# Patient Record
Sex: FEMALE | Race: BLACK OR AFRICAN AMERICAN | NOT HISPANIC OR LATINO | Employment: OTHER | URBAN - METROPOLITAN AREA
[De-identification: names, ages, dates, MRNs, and addresses within clinical notes are randomized per-mention and may not be internally consistent; named-entity substitution may affect disease eponyms.]

---

## 2017-04-19 ENCOUNTER — HOSPITAL ENCOUNTER (OUTPATIENT)
Dept: RADIOLOGY | Facility: OTHER | Age: 77
Discharge: HOME | End: 2017-04-19
Attending: OTOLARYNGOLOGY
Payer: MEDICARE

## 2017-04-19 DIAGNOSIS — J32.4 PANSINUSITIS: ICD-10-CM

## 2017-04-19 PROCEDURE — 70486 CT MAXILLOFACIAL W/O DYE: CPT | Mod: 26,,, | Performed by: RADIOLOGY

## 2017-04-19 PROCEDURE — 70486 CT MAXILLOFACIAL W/O DYE: CPT | Mod: TC

## 2017-05-23 PROBLEM — M79.2: Status: ACTIVE | Noted: 2017-05-23

## 2017-05-29 NOTE — PLAN OF CARE
05/29/17 1026   ED Admissions Case Approval   ED Admissions Case Approval (!) CM Approved  (OP )

## 2017-06-01 ENCOUNTER — HOSPITAL ENCOUNTER (OUTPATIENT)
Dept: PREADMISSION TESTING | Facility: OTHER | Age: 77
Discharge: HOME | End: 2017-06-01
Attending: OTOLARYNGOLOGY
Payer: MEDICARE

## 2017-06-01 ENCOUNTER — ANESTHESIA EVENT (OUTPATIENT)
Dept: SURGERY | Facility: OTHER | Age: 77
End: 2017-06-01
Payer: MEDICARE

## 2017-06-01 VITALS
RESPIRATION RATE: 16 BRPM | HEIGHT: 64 IN | WEIGHT: 170 LBS | SYSTOLIC BLOOD PRESSURE: 134 MMHG | TEMPERATURE: 98 F | OXYGEN SATURATION: 97 % | DIASTOLIC BLOOD PRESSURE: 68 MMHG | BODY MASS INDEX: 29.02 KG/M2 | HEART RATE: 68 BPM

## 2017-06-01 DIAGNOSIS — Z01.818 PRE-OPERATIVE CLEARANCE: ICD-10-CM

## 2017-06-01 PROCEDURE — 93010 ELECTROCARDIOGRAM REPORT: CPT | Mod: ,,, | Performed by: INTERNAL MEDICINE

## 2017-06-01 PROCEDURE — 93005 ELECTROCARDIOGRAM TRACING: CPT

## 2017-06-01 RX ORDER — LIDOCAINE HYDROCHLORIDE 10 MG/ML
1 INJECTION, SOLUTION EPIDURAL; INFILTRATION; INTRACAUDAL; PERINEURAL ONCE
Status: CANCELLED | OUTPATIENT
Start: 2017-06-01 | End: 2017-06-01

## 2017-06-01 RX ORDER — SODIUM CHLORIDE, SODIUM LACTATE, POTASSIUM CHLORIDE, CALCIUM CHLORIDE 600; 310; 30; 20 MG/100ML; MG/100ML; MG/100ML; MG/100ML
INJECTION, SOLUTION INTRAVENOUS CONTINUOUS
Status: CANCELLED | OUTPATIENT
Start: 2017-06-01

## 2017-06-01 RX ORDER — PREGABALIN 75 MG/1
150 CAPSULE ORAL
Status: DISCONTINUED | OUTPATIENT
Start: 2017-06-07 | End: 2017-06-02 | Stop reason: HOSPADM

## 2017-06-01 RX ORDER — MIDAZOLAM HYDROCHLORIDE 5 MG/ML
2 INJECTION INTRAMUSCULAR; INTRAVENOUS
Status: CANCELLED | OUTPATIENT
Start: 2017-06-01 | End: 2017-06-01

## 2017-06-01 NOTE — DISCHARGE INSTRUCTIONS
PRE-ADMIT TESTING -  [phone]    [address]  [facility]        OUTPATIENT SURGERY UNIT - [phone]    Your surgery has been scheduled at [facility]. We are pleased to have the opportunity to serve you. For Further Information please call [phone].    On the day of surgery please report to the Information Desk on the 1st floor.    · CONTACT YOUR PHYSICIAN'S OFFICE THE DAY PRIOR TO YOUR SURGERY TO OBTAIN YOUR ARRIVAL TIME.     · The evening before surgery do not eat anything after 9 p.m. ( this includes hard candy, chewing gum and mints).  You may only have GATORADE, POWERADE AND WATER  from 9 p.m. until you leave your home.   DO NOT DRINK ANY LIQUIDS ON THE WAY TO THE HOSPITAL.      SPECIAL MEDICATION INSTRUCTIONS: TAKE medications checked off by the Anesthesiologist on your Medication List.    Angiogram Patients: Take medications as instructed by your physician, including aspirin.     Surgery Patients:    If you take ASPIRIN - Your PHYSICIAN/SURGEON will need to inform you IF/OR when you need to stop taking aspirin prior to your surgery.     Do Not take any medications containing IBUPROFEN.  Do Not Wear any make-up or dark nail polish   (especially eye make-up) to surgery. If you come to surgery with makeup on you will be required to remove the makeup or nail polish.    Do not shave your surgical area at least 5 days prior to your surgery. The surgical prep will be performed at the hospital according to Infection Control regulations.    Leave all valuables at home.   Do Not wear any jewelry or watches, including any metal in body piercings.  Contact Lens must be removed before surgery. Either do not wear the contact lens or bring a case and solution for storage.  Please bring a container for eyeglasses or dentures as required.  Bring any paperwork your physician has provided, such as consent forms,  history and physicals, doctor's orders, etc.   Bring comfortable clothes  that are loose fitting to wear upon discharge. Take into consideration the type of surgery being performed.  Maintain your diet as advised per your physician the day prior to surgery.      Adequate rest the night before surgery is advised.   Park in the Parking lot behind the hospital or in the [facility] across the street from the parking lot. Parking is complimentary.  If you will be discharged the same day as your procedure, please arrange for a responsible adult to drive you home or to accompany you if traveling by taxi.   YOU WILL NOT BE PERMITTED TO DRIVE OR TO LEAVE THE HOSPITAL ALONE AFTER SURGERY.   It is strongly recommended that you arrange for someone to remain with you for the first 24 hrs following your surgery.       Thank you for your cooperation.  The Staff of [facility].        Bathing Instructions                                                                 Please shower the evening before and morning of your procedure with    ANTIBACTERIAL SOAP. ( DIAL, etc )  Concentrate on the surgical area   for at least 3 minutes and rinse completely. Dry off as usual.   Do not use any deodorant, powder, body lotions, perfume, after shave or    cologne.

## 2017-06-01 NOTE — ANESTHESIA PREPROCEDURE EVALUATION
06/01/2017  Sameer New is a 76 y.o., female.    Anesthesia Evaluation    I have reviewed the Patient Summary Reports.    I have reviewed the Nursing Notes.   I have reviewed the Medications.     Review of Systems  Anesthesia Hx:  No problems with previous Anesthesia  History of prior surgery of interest to airway management or planning:   Social:  Former Smoker Stopped smoking 2007   Hematology/Oncology:  Hematology Normal        EENT/Dental:EENT/Dental Normal   Cardiovascular:   Hypertension, well controlled hyperlipidemia    Pulmonary:  Pulmonary Normal    Renal/:  Renal/ Normal  Pt states no kidney disease   Hepatic/GI:  Hepatic/GI Normal    Musculoskeletal:   Arthritis     Neurological:  Neurology Normal    Endocrine:  Endocrine Normal    Dermatological:  Skin Normal    Psych:  Psychiatric Normal           Physical Exam  General:  Obesity      Dental:  Dental Findings: upper partial dentures, lower partial dentures        Mental Status:  Mental Status Findings:  Cooperative, Alert and Oriented         Anesthesia Plan  Type of Anesthesia, risks & benefits discussed:  Anesthesia Type:  general  Patient's Preference:   Intra-op Monitoring Plan:   Intra-op Monitoring Plan Comments:   Post Op Pain Control Plan:   Post Op Pain Control Plan Comments:   Induction:   IV  Beta Blocker:         Informed Consent: Patient understands risks and agrees with Anesthesia plan.  Questions answered. Anesthesia consent signed with patient.  ASA Score: 2     Day of Surgery Review of History & Physical:    H&P update referred to the surgeon.     Anesthesia Plan Notes: Labs in epic        Ready For Surgery From Anesthesia Perspective.

## 2017-06-07 ENCOUNTER — ANESTHESIA (OUTPATIENT)
Dept: SURGERY | Facility: OTHER | Age: 77
End: 2017-06-07
Payer: MEDICARE

## 2017-06-07 ENCOUNTER — HOSPITAL ENCOUNTER (OUTPATIENT)
Facility: OTHER | Age: 77
Discharge: HOME | End: 2017-06-07
Attending: OTOLARYNGOLOGY | Admitting: OTOLARYNGOLOGY
Payer: MEDICARE

## 2017-06-07 VITALS
TEMPERATURE: 97 F | RESPIRATION RATE: 16 BRPM | DIASTOLIC BLOOD PRESSURE: 69 MMHG | HEART RATE: 80 BPM | HEIGHT: 64 IN | WEIGHT: 170 LBS | BODY MASS INDEX: 29.02 KG/M2 | OXYGEN SATURATION: 96 % | SYSTOLIC BLOOD PRESSURE: 138 MMHG

## 2017-06-07 DIAGNOSIS — D38.5 NEOPLASM OF UNCERTAIN BEHAVIOR OF NASAL CAVITIES: Primary | ICD-10-CM

## 2017-06-07 DIAGNOSIS — D38.5 NEOPLASM OF UNCERTAIN BEHAVIOR OF ACCESSORY SINUS: ICD-10-CM

## 2017-06-07 PROCEDURE — 88305 TISSUE EXAM BY PATHOLOGIST: CPT | Mod: 26,,, | Performed by: PATHOLOGY

## 2017-06-07 PROCEDURE — 37000008 HC ANESTHESIA 1ST 15 MINUTES: Performed by: OTOLARYNGOLOGY

## 2017-06-07 PROCEDURE — 25000003 PHARM REV CODE 250: Performed by: OTOLARYNGOLOGY

## 2017-06-07 PROCEDURE — 88305 TISSUE EXAM BY PATHOLOGIST: CPT | Mod: 59 | Performed by: PATHOLOGY

## 2017-06-07 PROCEDURE — 63600175 PHARM REV CODE 636 W HCPCS: Performed by: NURSE ANESTHETIST, CERTIFIED REGISTERED

## 2017-06-07 PROCEDURE — 71000016 HC POSTOP RECOV ADDL HR: Performed by: OTOLARYNGOLOGY

## 2017-06-07 PROCEDURE — 37000009 HC ANESTHESIA EA ADD 15 MINS: Performed by: OTOLARYNGOLOGY

## 2017-06-07 PROCEDURE — 25000003 PHARM REV CODE 250: Performed by: NURSE ANESTHETIST, CERTIFIED REGISTERED

## 2017-06-07 PROCEDURE — 36000707: Performed by: OTOLARYNGOLOGY

## 2017-06-07 PROCEDURE — 71000033 HC RECOVERY, INTIAL HOUR: Performed by: OTOLARYNGOLOGY

## 2017-06-07 PROCEDURE — 27201423 OPTIME MED/SURG SUP & DEVICES STERILE SUPPLY: Performed by: OTOLARYNGOLOGY

## 2017-06-07 PROCEDURE — 71000015 HC POSTOP RECOV 1ST HR: Performed by: OTOLARYNGOLOGY

## 2017-06-07 PROCEDURE — 25000003 PHARM REV CODE 250: Performed by: ANESTHESIOLOGY

## 2017-06-07 PROCEDURE — 36000706: Performed by: OTOLARYNGOLOGY

## 2017-06-07 RX ORDER — ROCURONIUM BROMIDE 10 MG/ML
INJECTION, SOLUTION INTRAVENOUS
Status: DISCONTINUED | OUTPATIENT
Start: 2017-06-07 | End: 2017-06-07

## 2017-06-07 RX ORDER — FENTANYL CITRATE 50 UG/ML
INJECTION, SOLUTION INTRAMUSCULAR; INTRAVENOUS
Status: DISCONTINUED | OUTPATIENT
Start: 2017-06-07 | End: 2017-06-07

## 2017-06-07 RX ORDER — SODIUM CHLORIDE, SODIUM LACTATE, POTASSIUM CHLORIDE, CALCIUM CHLORIDE 600; 310; 30; 20 MG/100ML; MG/100ML; MG/100ML; MG/100ML
INJECTION, SOLUTION INTRAVENOUS CONTINUOUS
Status: DISCONTINUED | OUTPATIENT
Start: 2017-06-07 | End: 2017-06-07 | Stop reason: HOSPADM

## 2017-06-07 RX ORDER — LIDOCAINE HYDROCHLORIDE 10 MG/ML
1 INJECTION, SOLUTION EPIDURAL; INFILTRATION; INTRACAUDAL; PERINEURAL ONCE
Status: DISCONTINUED | OUTPATIENT
Start: 2017-06-07 | End: 2017-06-07 | Stop reason: HOSPADM

## 2017-06-07 RX ORDER — HYDROCODONE BITARTRATE AND ACETAMINOPHEN 5; 325 MG/1; MG/1
1 TABLET ORAL EVERY 4 HOURS PRN
Status: DISCONTINUED | OUTPATIENT
Start: 2017-06-07 | End: 2017-06-07 | Stop reason: HOSPADM

## 2017-06-07 RX ORDER — MIDAZOLAM HYDROCHLORIDE 5 MG/ML
2 INJECTION INTRAMUSCULAR; INTRAVENOUS
Status: DISCONTINUED | OUTPATIENT
Start: 2017-06-07 | End: 2017-06-07 | Stop reason: HOSPADM

## 2017-06-07 RX ORDER — HYDROMORPHONE HYDROCHLORIDE 2 MG/ML
0.4 INJECTION, SOLUTION INTRAMUSCULAR; INTRAVENOUS; SUBCUTANEOUS EVERY 5 MIN PRN
Status: DISCONTINUED | OUTPATIENT
Start: 2017-06-07 | End: 2017-06-07 | Stop reason: HOSPADM

## 2017-06-07 RX ORDER — MEPERIDINE HYDROCHLORIDE 50 MG/ML
12.5 INJECTION INTRAMUSCULAR; INTRAVENOUS; SUBCUTANEOUS ONCE AS NEEDED
Status: DISCONTINUED | OUTPATIENT
Start: 2017-06-07 | End: 2017-06-07 | Stop reason: HOSPADM

## 2017-06-07 RX ORDER — FENTANYL CITRATE 50 UG/ML
25 INJECTION, SOLUTION INTRAMUSCULAR; INTRAVENOUS EVERY 5 MIN PRN
Status: DISCONTINUED | OUTPATIENT
Start: 2017-06-07 | End: 2017-06-07 | Stop reason: HOSPADM

## 2017-06-07 RX ORDER — NEOSTIGMINE METHYLSULFATE 1 MG/ML
INJECTION, SOLUTION INTRAVENOUS
Status: DISCONTINUED | OUTPATIENT
Start: 2017-06-07 | End: 2017-06-07

## 2017-06-07 RX ORDER — ONDANSETRON 8 MG/1
8 TABLET, ORALLY DISINTEGRATING ORAL EVERY 8 HOURS PRN
Status: DISCONTINUED | OUTPATIENT
Start: 2017-06-07 | End: 2017-06-07 | Stop reason: HOSPADM

## 2017-06-07 RX ORDER — SODIUM CHLORIDE 0.9 % (FLUSH) 0.9 %
3 SYRINGE (ML) INJECTION
Status: DISCONTINUED | OUTPATIENT
Start: 2017-06-07 | End: 2017-06-07 | Stop reason: HOSPADM

## 2017-06-07 RX ORDER — ACETAMINOPHEN 10 MG/ML
INJECTION, SOLUTION INTRAVENOUS
Status: DISCONTINUED | OUTPATIENT
Start: 2017-06-07 | End: 2017-06-07

## 2017-06-07 RX ORDER — DIPHENHYDRAMINE HYDROCHLORIDE 50 MG/ML
12.5 INJECTION INTRAMUSCULAR; INTRAVENOUS EVERY 30 MIN PRN
Status: DISCONTINUED | OUTPATIENT
Start: 2017-06-07 | End: 2017-06-07 | Stop reason: HOSPADM

## 2017-06-07 RX ORDER — ONDANSETRON 8 MG/1
8 TABLET, ORALLY DISINTEGRATING ORAL EVERY 8 HOURS PRN
Qty: 6 TABLET | Refills: 1 | Status: SHIPPED | OUTPATIENT
Start: 2017-06-07 | End: 2018-04-05 | Stop reason: SDUPTHER

## 2017-06-07 RX ORDER — HYDROCODONE BITARTRATE AND ACETAMINOPHEN 5; 325 MG/1; MG/1
1 TABLET ORAL EVERY 4 HOURS PRN
Qty: 20 TABLET | Refills: 0 | Status: SHIPPED | OUTPATIENT
Start: 2017-06-07 | End: 2017-09-19

## 2017-06-07 RX ORDER — ONDANSETRON 2 MG/ML
4 INJECTION INTRAMUSCULAR; INTRAVENOUS ONCE AS NEEDED
Status: DISCONTINUED | OUTPATIENT
Start: 2017-06-07 | End: 2017-06-07 | Stop reason: HOSPADM

## 2017-06-07 RX ORDER — DEXAMETHASONE SODIUM PHOSPHATE 4 MG/ML
INJECTION, SOLUTION INTRA-ARTICULAR; INTRALESIONAL; INTRAMUSCULAR; INTRAVENOUS; SOFT TISSUE
Status: DISCONTINUED | OUTPATIENT
Start: 2017-06-07 | End: 2017-06-07

## 2017-06-07 RX ORDER — BACITRACIN ZINC 500 UNIT/G
OINTMENT (GRAM) TOPICAL
Status: DISCONTINUED | OUTPATIENT
Start: 2017-06-07 | End: 2017-06-07 | Stop reason: HOSPADM

## 2017-06-07 RX ORDER — SODIUM CHLORIDE 0.9 % (FLUSH) 0.9 %
3 SYRINGE (ML) INJECTION EVERY 8 HOURS
Status: DISCONTINUED | OUTPATIENT
Start: 2017-06-07 | End: 2017-06-07 | Stop reason: HOSPADM

## 2017-06-07 RX ORDER — LIDOCAINE HYDROCHLORIDE AND EPINEPHRINE 10; 10 MG/ML; UG/ML
INJECTION, SOLUTION INFILTRATION; PERINEURAL
Status: DISCONTINUED | OUTPATIENT
Start: 2017-06-07 | End: 2017-06-07 | Stop reason: HOSPADM

## 2017-06-07 RX ORDER — ONDANSETRON 2 MG/ML
INJECTION INTRAMUSCULAR; INTRAVENOUS
Status: DISCONTINUED | OUTPATIENT
Start: 2017-06-07 | End: 2017-06-07

## 2017-06-07 RX ORDER — GLYCOPYRROLATE 0.2 MG/ML
INJECTION INTRAMUSCULAR; INTRAVENOUS
Status: DISCONTINUED | OUTPATIENT
Start: 2017-06-07 | End: 2017-06-07

## 2017-06-07 RX ORDER — LIDOCAINE HCL/PF 100 MG/5ML
SYRINGE (ML) INTRAVENOUS
Status: DISCONTINUED | OUTPATIENT
Start: 2017-06-07 | End: 2017-06-07

## 2017-06-07 RX ORDER — OXYCODONE HYDROCHLORIDE 5 MG/1
5 TABLET ORAL
Status: DISCONTINUED | OUTPATIENT
Start: 2017-06-07 | End: 2017-06-07 | Stop reason: HOSPADM

## 2017-06-07 RX ORDER — PROPOFOL 10 MG/ML
VIAL (ML) INTRAVENOUS
Status: DISCONTINUED | OUTPATIENT
Start: 2017-06-07 | End: 2017-06-07

## 2017-06-07 RX ADMIN — LIDOCAINE HYDROCHLORIDE 100 MG: 20 INJECTION, SOLUTION INTRAVENOUS at 07:06

## 2017-06-07 RX ADMIN — GLYCOPYRROLATE 0.8 MG: 0.2 INJECTION, SOLUTION INTRAMUSCULAR; INTRAVENOUS at 07:06

## 2017-06-07 RX ADMIN — FENTANYL CITRATE 100 MCG: 50 INJECTION, SOLUTION INTRAMUSCULAR; INTRAVENOUS at 07:06

## 2017-06-07 RX ADMIN — OXYCODONE HYDROCHLORIDE 5 MG: 5 TABLET ORAL at 07:06

## 2017-06-07 RX ADMIN — DEXTROSE 2 G: 50 INJECTION, SOLUTION INTRAVENOUS at 07:06

## 2017-06-07 RX ADMIN — Medication 20 MG: at 07:06

## 2017-06-07 RX ADMIN — CARBOXYMETHYLCELLULOSE SODIUM 4 DROP: 2.5 SOLUTION/ DROPS OPHTHALMIC at 07:06

## 2017-06-07 RX ADMIN — ACETAMINOPHEN 1000 MG: 10 INJECTION, SOLUTION INTRAVENOUS at 07:06

## 2017-06-07 RX ADMIN — DEXAMETHASONE SODIUM PHOSPHATE 8 MG: 4 INJECTION, SOLUTION INTRAMUSCULAR; INTRAVENOUS at 07:06

## 2017-06-07 RX ADMIN — FENTANYL CITRATE 25 MCG: 50 INJECTION, SOLUTION INTRAMUSCULAR; INTRAVENOUS at 07:06

## 2017-06-07 RX ADMIN — ONDANSETRON 4 MG: 2 INJECTION INTRAMUSCULAR; INTRAVENOUS at 07:06

## 2017-06-07 RX ADMIN — ROCURONIUM BROMIDE 30 MG: 10 INJECTION, SOLUTION INTRAVENOUS at 07:06

## 2017-06-07 RX ADMIN — PROPOFOL 200 MG: 10 INJECTION, EMULSION INTRAVENOUS at 07:06

## 2017-06-07 RX ADMIN — NEOSTIGMINE METHYLSULFATE 4 MG: 1 INJECTION INTRAVENOUS at 07:06

## 2017-06-07 RX ADMIN — SODIUM CHLORIDE, SODIUM LACTATE, POTASSIUM CHLORIDE, AND CALCIUM CHLORIDE: 600; 310; 30; 20 INJECTION, SOLUTION INTRAVENOUS at 06:06

## 2017-06-07 NOTE — PLAN OF CARE
Patient prefers to have  Fatoumata daughter present for discharge teaching. Please contact them @ [phone].

## 2017-06-07 NOTE — H&P
The patient has been examined and the H&P has been reviewed:  I concur with the findings and no changes have occurred since H&P was written.      Anesthesia/Surgery risks, benefits and alternative options discussed and understood by patient/family.

## 2017-06-07 NOTE — DISCHARGE INSTRUCTIONS
[facility] POST-OP INSTRUCTIONS   NASAL AND SINUS SURGERY PATIENTS    DO'S   *Sleep with your head elevated for the first few days, either on extra pillows or in a recliner chair.    *Take medications as prescribed   *Change drip pad under nose (if present) as needed.   *Use a cool-mist humidifier at night for the first week to moisturize the air   *If instructed by your physician, use salt water nasal sprays 48 hrs after surgery.   Salt water nasal sprays such as Ocean or AYR help humidify the nose, reduce crusts, and promote healing of the nose.  Simply inhale 2-3 sprays in each nostril gently 4-5 times a day or more.   * If instructed by your physician, salt water irrigations twice daily with a plastic bulb syringe, or Water-Pik nasal attachment can be performed beginning at 48 hrs. Use 1/2 tsp. Salt in 1 cup of warm water to make the solution.    DON'TS   * No nose blowing for one week! Nose blowing soon after surgery can cause nosebleeds.   * No straining, bending over, or heavy lifting for at least one week   * No aspirin or non-steroidal anti-inflammatory agents (such as Advil, Motrin, Nuprin, etc.) for at least one week after surgery since they can aggravate bleeding.  Tylenol ( Acetaminophen) is best after surgery.   * No eyeglasses should be worn after surgery for 2-3 weeks if any cosmetic nasal surgery has been performed.    * Please don't take any medications unless prescribed by one of your physicians.      GENERAL INFORMATION  Your doctor will ask you to return to the office within 1-3 days after surgery for an examination. Nasal splints or packs if present may be removed at that time. Also, most patients will need to have additional visits weekly for several weeks to assess healing and for nasal cleaning.  Slight bloody drainage is normal for the first few days after surgery and may occur for several weeks during the healing process.  However, if you have severe or persistent bleeding, please contact your  doctor immediately.  Also please notify your doctor if you experience any of the following: sudden or severe facial swelling, any new vision problems, severe pain or fever, or severe nausea and vomiting.       Anesthesia: After Your Surgery  Youve just had surgery. During surgery, you received medication called anesthesia to keep you comfortable and pain-free. After surgery, you may experience some pain or nausea. This is common. Here are some tips for feeling better and recovering after surgery.    Going home  Your doctor or nurse will show you how to take care of yourself when you go home. He or she will also answer your questions. Have an adult family member or friend drive you home. For the first 24 hours after your surgery:  · Do not drive or use heavy equipment.  · Do not make important decisions or sign legal documents.  · Avoid alcohol.  · Have someone stay with you, if needed. He or she can watch for problems and help keep you safe.  Be sure to keep all follow-up appointments with your doctor. And rest after your procedure for as long as your doctor tells you to.    Coping with pain  If you have pain after surgery, pain medication will help you feel better. Take it as directed, before pain becomes severe. Also, ask your doctor or pharmacist about other ways to control pain, such as with heat, ice, and relaxation. And follow any other instructions your surgeon or nurse gives you.    Tips for taking pain medication  To get the best relief possible, remember these points:  · Pain medications can upset your stomach. Taking them with a little food may help.  · Most pain relievers taken by mouth need at least 20 to 30 minutes to take effect.  · Taking medication on a schedule can help you remember to take it. Try to time your medication so that you can take it before beginning an activity, such as dressing, walking, or sitting down for dinner.  · Constipation is a common side effect of pain medications. Contact  your doctor before taking any medications like laxatives or stool softeners to help relieve constipation. Also ask about any dietary restrictions, because drinking lots of fluids and eating foods like fruits and vegetables that are high in fiber can also help. Remember, dont take laxatives unless your surgeon has prescribed them.  · Mixing alcohol and pain medication can cause dizziness and slow your breathing. It can even be fatal. Dont drink alcohol while taking pain medication.  · Pain medication can slow your reflexes. Dont drive or operate machinery while taking pain medication.  If your health care provider tells you to take acetaminophen to help relieve your pain, ask him or her how much you are supposed to take each day. (Acetaminophen is the generic name for Tylenol and other brand-name pain relievers.) Acetaminophen or other pain relievers may interact with your prescription medicines or other over-the-counter (OTC) drugs. Some prescription medications contain acetaminophen along with other active ingredients. Using both prescription and OTC acetaminophen for pain can cause you to overdose. The FDA recommends that you read the labels on your OTC medications carefully. This will help you to clearly understand the list of active ingredients, dosing instructions, and any warnings. It may also help you avoid taking too much acetaminophen. If you have questions or don't understand the information, ask your pharmacist or health care provider to explain it to you before you take the OTC medication.    Managing nausea  Some people have an upset stomach after surgery. This is often due to anesthesia, pain, pain medications, or the stress of surgery. The following tips will help you manage nausea and get good nutrition as you recover. If you were on a special diet before surgery, ask your doctor if you should follow it during recovery. These tips may help:  · Dont push yourself to eat. Your body will tell you  when to eat and how much.  · Start off with clear liquids and soup. They are easier to digest.  · Progress to semi-solid foods (mashed potatoes, applesauce, and gelatin) as you feel ready.  · Slowly move to solid foods. Dont eat fatty, rich, or spicy foods at first.  · Dont force yourself to have three large meals a day. Instead, eat smaller amounts more often.  · Take pain medications with a small amount of solid food, such as crackers or toast to avoid nausea.      Call your surgeon if  · You still have pain an hour after taking medication (it may not be strong enough).  · You feel too sleepy, dizzy, or groggy (medication may be too strong).  · You have side effects like nausea, vomiting, or skin changes (rash, itching, or hives).   © 0870-9483 [employer] [address] [place] [address], [place] [address]. All rights reserved. This information is not intended as a substitute for professional medical care. Always follow your healthcare professional's instructions.

## 2017-06-07 NOTE — TRANSFER OF CARE
"Anesthesia Transfer of Care Note    Patient: Sameer New    Procedure(s) Performed: Procedure(s) (LRB):  ENDOSCOPIC EXCISION-LESION- OF RIGHT INFERIOR TURBINATE MASS (Right)    Patient location: PACU    Anesthesia Type: general    Transport from OR: Transported from OR on 2-3 L/min O2 by NC with adequate spontaneous ventilation    Post pain: adequate analgesia    Post assessment: no apparent anesthetic complications and tolerated procedure well    Post vital signs: stable    Level of consciousness: awake, alert and oriented    Nausea/Vomiting: no nausea/vomiting    Complications: none    Transfer of care protocol was followed      Last vitals:   Visit Vitals  /67 (BP Location: Right arm, Patient Position: Lying, BP Method: Automatic)   Pulse 91   Temp 36.4 °C (97.5 °F) (Oral)   Resp 16   Ht 5' 4" (1.626 m)   Wt 77.1 kg (170 lb)   SpO2 99%   Breastfeeding? No   BMI 29.18 kg/m²     "

## 2017-06-07 NOTE — ANESTHESIA POSTPROCEDURE EVALUATION
"Anesthesia Post Evaluation    Patient: Sameer New    Procedure(s) Performed: Procedure(s) (LRB):  ENDOSCOPIC EXCISION-LESION- OF RIGHT INFERIOR TURBINATE MASS (Right)    Final Anesthesia Type: general  Patient location during evaluation: PACU  Patient participation: Yes- Able to Participate  Level of consciousness: oriented and awake  Post-procedure vital signs: reviewed and stable  Pain management: adequate  Airway patency: patent  PONV status at discharge: No PONV  Anesthetic complications: no      Cardiovascular status: hemodynamically stable  Respiratory status: unassisted, spontaneous ventilation and room air  Hydration status: euvolemic  Follow-up not needed.        Visit Vitals  /69 (BP Location: Right arm, Patient Position: Lying, BP Method: Automatic)   Pulse 80   Temp 36.3 °C (97.4 °F) (Oral)   Resp 16   Ht 5' 4" (1.626 m)   Wt 77.1 kg (170 lb)   SpO2 96%   Breastfeeding? No   BMI 29.18 kg/m²       Pain/Lui Score: Pain Assessment Performed: Yes (6/7/2017  8:45 AM)  Presence of Pain: complains of pain/discomfort (6/7/2017  8:45 AM)  Pain Rating Prior to Med Admin: 5 (6/7/2017  7:52 AM)  Pain Rating Post Med Admin: 4 (6/7/2017  8:30 AM)  Lui Score: 10 (6/7/2017  8:45 AM)      "

## 2017-06-07 NOTE — BRIEF OP NOTE
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]    Procedure(s) (LRB):  ENDOSCOPIC EXCISION-LESION- OF RIGHT INFERIOR TURBINATE MASS (Right)    Anesthesia: General    Findings/Key Components:  Dictated    Estimated Blood Loss: Minimal         Specimens     None            Discharge Note      SUMMARY     Admit Date: 6/7/2017    Attending Physician: Speedy Leung, *     Discharge Physician: Speedy Leung, *    Discharge Date: 6/7/2017    Final Diagnosis: 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  Qty: 20 tablet, Refills: 0      ondansetron (ZOFRAN-ODT) 8 MG TbDL Take 1 tablet (8 mg total) by mouth every 8 (eight) hours as needed.  Qty: 6 tablet, Refills: 1         CONTINUE these medications which have NOT CHANGED    Details   amlodipine (NORVASC) 10 MG tablet Refills: 1      diclofenac (VOLTAREN) 75 MG EC tablet       hydrochlorothiazide (HYDRODIURIL) 12.5 MG Tab Take 1 tablet (12.5 mg total) by mouth once daily.  Qty: 90 tablet, Refills: 1      !! levocetirizine (XYZAL) 5 MG tablet TAKE 1 TABLET(5 MG) BY MOUTH EVERY EVENING  Qty: 30 tablet, Refills: 1      triamcinolone acetonide 0.1% (KENALOG) 0.1 % cream Apply topically 2 (two) times daily.  Qty: 80 g, Refills: 2    Associated Diagnoses: Essential hypertension; Elevated hemoglobin A1c; Hyperlipidemia, unspecified hyperlipidemia type; Bacteriuria; BMI 31.0-31.9,adult      !! levocetirizine (XYZAL) 5 MG tablet TAKE 1 TABLET(5 MG) BY MOUTH  EVERY EVENING  Qty: 90 tablet, Refills: 0      pantoprazole (PROTONIX) 40 MG tablet Take 1 tablet (40 mg total) by mouth once daily.  Qty: 30 tablet, Refills: 2      simvastatin (ZOCOR) 20 MG tablet Take 1 tablet (20 mg total) by mouth every evening.  Qty: 90 tablet, Refills: 1    Associated Diagnoses: Essential hypertension; Elevated hemoglobin A1c; Hyperlipidemia, unspecified hyperlipidemia type; Bacteriuria; BMI 31.0-31.9,adult       !! - Potential duplicate medications found. Please discuss with provider.      STOP taking these medications       fluticasone (FLONASE) 50 mcg/actuation nasal spray Comments:   Reason for Stopping:               Discharge Procedure Orders (must include Diet, Follow-up, Activity)    Discharge Procedure Orders (must include Diet, Follow-up, Activity)  Diet general   Order Comments: Advance diet as tolerated.     Activity as tolerated   Order Comments: Sleep head of bed elevated for 72hrs  No heavy lifting  No nose blowing  No strenuous activity  No driving while on pain medications     Call MD for:  temperature >100.4     Call MD for:  persistent nausea and vomiting     Call MD for:  severe uncontrolled pain     Call MD for:  difficulty breathing, headache or visual disturbances     Leave dressing on - Keep it clean, dry, and intact until clinic visit

## 2017-06-08 NOTE — OP NOTE
DATE OF PROCEDURE:  06/07/2017    PREOPERATIVE DIAGNOSIS:  Right inferior turbinate lesion.    POSTOPERATIVE DIAGNOSIS:  Right inferior turbinate lesion.    PROCEDURE PERFORMED:  Endoscopic excision of right nasal mass.    SURGEON:  Speedy Leung M.D.    BLOOD LOSS:  50 mL.    COMPLICATIONS:  None.    ANESTHETIC:  General endotracheal.    OPERATIVE FINDINGS:  Polypoid fungating mass in the right inferior turbinate.    INDICATIONS:  Ms. Sameer New is 76 years old, presented to the office with   nasal obstruction and some bleeding.  On exam, she had lesion on the right   inferior turbinate, which obstructed the nasal cavity.  The lesion encompassed   the inferior turbinates started approximately 1.5 cm back from the entrance of   the naris and it was along the inferior medial border of the inferior turbinate.    It was recommended that this be removed, both as a biopsy and also from   functional standpoint to improve nasal airflow.  Informed consent was obtained   to do this in the Operating Room due to the risk of bleeding and for patient   comfort.  Informed consent was obtained.    OPERATIVE PROCEDURE:  She was taken to the Operating Room and placed under   anesthesia.  Each nasal cavity was decongested.  Her nose was then prepped and   draped in a sterile fashion.  The endoscopic examination was performed and the   left side was addressed and there was no evidence of significant pathology on   endonasal exam.  On the right side, there was a marked asymmetry in the inferior   turbinate, the first centimeter was normal-appearing middle turbinate and there   was a polypoid growth, which was suspicious on the inferior turbinate.  It was   soft and pliable.  A biopsy was obtained with pituitary forceps and then the   entire lesion was then resected using a powered shaver and a 4.0 Tricut blade.    Bleeding was controlled with both topical pledgets as well as bipolar forceps.    Postoperatively, Telfa was placed  against the inferior turbinate to control   bleeding.  The patient was then brought out of the anesthetic, extubated and   brought to Recovery in stable condition.      TESSA  dd: 06/07/2017 16:48:25 (CDT)  td: 06/07/2017 21:15:22 (CDT)  Doc ID   #[id number]  Job ID #[id number]    CC:

## 2017-07-11 PROBLEM — M25.561 RIGHT KNEE PAIN: Status: ACTIVE | Noted: 2017-07-11

## 2017-09-19 PROBLEM — R05.8 COUGH WITH EXPECTORATION: Status: ACTIVE | Noted: 2017-09-19

## 2017-10-25 PROBLEM — H57.89 EYE REDNESS: Status: ACTIVE | Noted: 2017-10-25

## 2018-01-23 PROBLEM — M79.602 PAIN OF LEFT UPPER EXTREMITY: Status: ACTIVE | Noted: 2018-01-23

## 2018-08-01 PROBLEM — M54.2 NECK PAIN: Status: ACTIVE | Noted: 2018-08-01

## 2018-08-01 PROBLEM — Z01.818 PRE-OP EVALUATION: Status: ACTIVE | Noted: 2018-08-01

## (undated) DEVICE — SEE MEDLINE ITEM 157110

## (undated) DEVICE — SEE MEDLINE ITEM 153151

## (undated) DEVICE — CLOSURE SKIN STERI STRIP 1/2X4

## (undated) DEVICE — SEE MEDLINE ITEM 156934

## (undated) DEVICE — BLADE TRICUT ROTATABLE 4MM

## (undated) DEVICE — SOL NACL 0.9% INJ 500ML BG

## (undated) DEVICE — POSITIONER HEAD DONUT 9IN FOAM

## (undated) DEVICE — POSITIONER IV ARMBOARD FOAM

## (undated) DEVICE — SKIN MARKER DEVON 160

## (undated) DEVICE — SEE MEDLINE ITEM 152622

## (undated) DEVICE — CONTAINER SPECIMEN STRL 4OZ

## (undated) DEVICE — SOL 9P NACL IRR PIC IL